# Patient Record
Sex: FEMALE | Race: WHITE | NOT HISPANIC OR LATINO | Employment: OTHER | URBAN - METROPOLITAN AREA
[De-identification: names, ages, dates, MRNs, and addresses within clinical notes are randomized per-mention and may not be internally consistent; named-entity substitution may affect disease eponyms.]

---

## 2017-01-09 ENCOUNTER — OFFICE VISIT (OUTPATIENT)
Dept: CARDIOLOGY | Facility: CLINIC | Age: 77
End: 2017-01-09
Attending: PHYSICIAN ASSISTANT
Payer: COMMERCIAL

## 2017-01-09 VITALS
BODY MASS INDEX: 27.23 KG/M2 | WEIGHT: 148 LBS | DIASTOLIC BLOOD PRESSURE: 74 MMHG | HEART RATE: 80 BPM | HEIGHT: 62 IN | SYSTOLIC BLOOD PRESSURE: 144 MMHG

## 2017-01-09 DIAGNOSIS — I47.10 SUPRAVENTRICULAR TACHYCARDIA (H): ICD-10-CM

## 2017-01-09 DIAGNOSIS — R03.0 ELEVATED BLOOD-PRESSURE READING WITHOUT DIAGNOSIS OF HYPERTENSION: Primary | ICD-10-CM

## 2017-01-09 PROCEDURE — 99213 OFFICE O/P EST LOW 20 MIN: CPT | Performed by: INTERNAL MEDICINE

## 2017-01-09 RX ORDER — FLUOROURACIL 50 MG/G
CREAM TOPICAL
COMMUNITY
Start: 2016-12-19

## 2017-01-09 NOTE — Clinical Note
1/9/2017    [facility]  [address] [address]    RE: Chasity Daviskevon       Dear Colleague,    I had the pleasure of seeing Chasity Dickerson in Cardiology Clinic today.  She is a 76-year-old female with history of occasional short runs of supraventricular tachycardia who returns for a followup.  She was seen by me last year and had a Zio Patch monitor which showed short SVT runs not longer than 11 seconds.  We offered her a beta blocker but she was already feeling better and she opted not to pursue it.  She has had high blood pressure readings in our office, but at home with her own blood pressure instrument shows normal blood pressure readings.  She denies any chest pain or shortness of breath.        PHYSICAL EXAMINATION:  Her blood pressure today again is 144/74, pulse 80 per minute and regular.  Cardiopulmonary examination unremarkable.        She is on lisinopril.  Her last lab work was at [facility] in 09/2016 and showed normal creatinine and potassium.  Cholesterol in 03/2016 showed LDL of 70, HDL of 90.  She works out practically every day.     Outpatient Encounter Prescriptions as of 1/9/2017   Medication Sig Dispense Refill     fluorouracil (EFUDEX) 5 % cream        allopurinol (ZYLOPRIM) 100 MG tablet Take 200 mg by mouth daily        aspirin 81 MG tablet Take 81 mg by mouth daily       lisinopril (PRINIVIL,ZESTRIL) 10 MG tablet Take 10 mg by mouth daily       chlorthalidone (HYGROTON) 25 MG tablet Take 25 mg by mouth daily       atorvastatin (LIPITOR) 40 MG tablet Take 40 mg by mouth daily       conjugated estrogens (PREMARIN) vaginal cream Place vaginally three times a week       calcium-vitamin D (CALTRATE) 600-400 MG-UNIT per tablet Take 1 tablet by mouth daily       multivitamin, therapeutic (THERA-VIT) TABS Take 1 tablet by mouth At Bedtime       Ascorbic Acid (VITAMIN C PO) Take by mouth every evening Dose unknown       No facility-administered encounter  medications on file as of 1/9/2017.      IMPRESSION:   1.  Supraventricular tachycardia.  The patient has had no recurrent palpitations.  She has not needed to go back on metoprolol.  She is continuing to have 2 cups of caffeine every day without any symptoms.  Given that she is doing well, I would suggest no further evaluation for the arrhythmia.  If it gets worse, I have asked her to call us.  Since she is doing well, I have asked her to continue to follow with [facility] and to see me on a p.r.n. basis.   2.  Hypertension.  Her blood pressure is usually high in our office but at home she tells me her blood pressures have been normal.  To clarify this issue, I suggested a 24-hour ambulatory blood pressure instrument which she will wear.  Once we have the readings, we will decide further plan.  If it is elevated with a mean or average blood pressure over 24 hours greater than 135/85, further optimization of blood pressure will be useful.  In that case, we can consider increasing her antihypertensives.  Presently, she is on 10 of lisinopril and 25 mg of chlorthalidone.  Her BMP will have to be checked again before adjusting any of these medications.  Alternatively, she can be referred back to her primary care physician for management.      Thank you for allowing us to participate in the care of this nice patient.  We will call her with the results of her 24-hour ambulatory blood pressure instrument and make further recommendations.  Otherwise, she will see me on a p.r.n. basis.  She will return to see her primary care physician.     Sincerely,    Joseph Decker MD     [facility]

## 2017-01-09 NOTE — PROGRESS NOTES
HISTORY OF PRESENT ILLNESS:  I had the pleasure of seeing Chasity Dickerson in Cardiology Clinic today.  She is a 76-year-old female with history of occasional short runs of supraventricular tachycardia who returns for a followup.  She was seen by me last year and had a Zio Patch monitor which showed short SVT runs not longer than 11 seconds.  We offered her a beta blocker but she was already feeling better and she opted not to pursue it.  She has had high blood pressure readings in our office, but at home with her own blood pressure instrument shows normal blood pressure readings.  She denies any chest pain or shortness of breath.        PHYSICAL EXAMINATION:  Her blood pressure today again is 144/74, pulse 80 per minute and regular.  Cardiopulmonary examination unremarkable.        She is on lisinopril.  Her last lab work was at [facility] in 09/2016 and showed normal creatinine and potassium.  Cholesterol in 03/2016 showed LDL of 70, HDL of 90.  She works out practically every day.      IMPRESSION:   1.  Supraventricular tachycardia.  The patient has had no recurrent palpitations.  She has not needed to go back on metoprolol.  She is continuing to have 2 cups of caffeine every day without any symptoms.  Given that she is doing well, I would suggest no further evaluation for the arrhythmia.  If it gets worse, I have asked her to call us.  Since she is doing well, I have asked her to continue to follow with [facility] and to see me on a p.r.n. basis.   2.  Hypertension.  Her blood pressure is usually high in our office but at home she tells me her blood pressures have been normal.  To clarify this issue, I suggested a 24-hour ambulatory blood pressure instrument which she will wear.  Once we have the readings, we will decide further plan.  If it is elevated with a mean or average blood pressure over 24 hours greater than 135/85, further optimization of blood pressure will be useful.  In that case, we  can consider increasing her antihypertensives.  Presently, she is on 10 of lisinopril and 25 mg of chlorthalidone.  Her BMP will have to be checked again before adjusting any of these medications.  Alternatively, she can be referred back to her primary care physician for management.      Thank you for allowing us to participate in the care of this nice patient.  We will call her with the results of her 24-hour ambulatory blood pressure instrument and make further recommendations.  Otherwise, she will see me on a p.r.n. basis.  She will return to see her primary care physician.      cc:   [facility]         MAGDA HUNTER MD             D: 2017 09:37   T: 2017 11:48   MT: CRISTOBAL      Name:     AGAPITO MANE   MRN:      [id number]        Account:      [id number]   :      1940           Service Date: 2017      Document: [id number]

## 2017-01-09 NOTE — PROGRESS NOTES
HPI and Plan:   See dictation  [id number]    Orders Placed This Encounter   Procedures     24 Hour Blood Pressure Monitor - Adult       Orders Placed This Encounter   Medications     fluorouracil (EFUDEX) 5 % cream     Sig:        There are no discontinued medications.      Encounter Diagnoses   Name Primary?     Supraventricular tachycardia (H)      Elevated blood-pressure reading without diagnosis of hypertension Yes       CURRENT MEDICATIONS:  Current Outpatient Prescriptions   Medication Sig Dispense Refill     fluorouracil (EFUDEX) 5 % cream        allopurinol (ZYLOPRIM) 100 MG tablet Take 200 mg by mouth daily        aspirin 81 MG tablet Take 81 mg by mouth daily       lisinopril (PRINIVIL,ZESTRIL) 10 MG tablet Take 10 mg by mouth daily       chlorthalidone (HYGROTON) 25 MG tablet Take 25 mg by mouth daily       atorvastatin (LIPITOR) 40 MG tablet Take 40 mg by mouth daily       conjugated estrogens (PREMARIN) vaginal cream Place vaginally three times a week       calcium-vitamin D (CALTRATE) 600-400 MG-UNIT per tablet Take 1 tablet by mouth daily       multivitamin, therapeutic (THERA-VIT) TABS Take 1 tablet by mouth At Bedtime       Ascorbic Acid (VITAMIN C PO) Take by mouth every evening Dose unknown         ALLERGIES     Allergies   Allergen Reactions     Atenolol      lightheadedness       PAST MEDICAL HISTORY:  Past Medical History   Diagnosis Date     Chronic kidney disease      Hypertension      Palpitations      Cataracts, bilateral      Gout      Chronic diarrhea      Glaucoma      Acute onset aura migraine      First degree AV block        PAST SURGICAL HISTORY:  Past Surgical History   Procedure Laterality Date     Orthopedic surgery       Ent surgery       tonsillectomy     Lig fallopion         FAMILY HISTORY:  Family History   Problem Relation Age of Onset     Other - See Comments Mother      hemorrhage during birth     Emphysema Father      CANCER Brother        SOCIAL HISTORY:  Social History  "    Social History     Marital Status:      Spouse Name: N/A     Number of Children: N/A     Years of Education: N/A     Social History Main Topics     Smoking status: Never Smoker      Smokeless tobacco: None     Alcohol Use: 0.0 oz/week     0 Standard drinks or equivalent per week      Comment: one glass wine daily     Drug Use: No     Sexual Activity: Not Asked     Other Topics Concern     Caffeine Concern Yes     2 cups coffee per day     Sleep Concern No     Stress Concern No     Weight Concern No     Special Diet Yes     low sodium diet     Back Care No     Exercise Yes     walks daily, weights, yoga     Parent/Sibling W/ Cabg, Mi Or Angioplasty Before 65f 55m? No     Social History Narrative       Review of Systems:  Skin:  Negative       Eyes:  Positive for glasses    ENT:  Negative      Respiratory:  Negative       Cardiovascular:  Negative      Gastroenterology: Negative      Genitourinary:  Negative      Musculoskeletal:  Negative      Neurologic:  Negative      Psychiatric:  Negative      Heme/Lymph/Imm:  Negative      Endocrine:  Negative        Physical Exam:  Vitals: /74 mmHg  Pulse 80  Ht 1.575 m (5' 2\")  Wt 67.132 kg (148 lb)  BMI 27.06 kg/m2    Constitutional:  cooperative;alert and oriented        Skin:  warm and dry to the touch        Head:  normocephalic        Eyes:  pupils equal and round        ENT:  not assessed this visit        Neck:  JVP normal;carotid pulses are full and equal bilaterally        Chest:  clear to auscultation          Cardiac: regular rhythm;normal S1 and S2     no presence of murmur            Abdomen:  abdomen soft;BS normoactive        Vascular:                                          Extremities and Back:  no edema              Neurological:  no gross motor deficits              LENNIE Story PA-C  [facility] [place] [address] [place]  [place] [address]                "

## 2017-01-16 ENCOUNTER — HOSPITAL ENCOUNTER (OUTPATIENT)
Dept: CARDIOLOGY | Facility: CLINIC | Age: 77
Discharge: HOME | End: 2017-01-16
Attending: INTERNAL MEDICINE | Admitting: INTERNAL MEDICINE
Payer: MEDICARE

## 2017-01-16 DIAGNOSIS — R03.0 ELEVATED BLOOD-PRESSURE READING WITHOUT DIAGNOSIS OF HYPERTENSION: ICD-10-CM

## 2017-01-16 PROCEDURE — 93788 AMBL BP MNTR W/SW A/R: CPT

## 2017-01-16 PROCEDURE — 93790 AMBL BP MNTR W/SW I&R: CPT | Performed by: INTERNAL MEDICINE

## 2017-01-16 PROCEDURE — 93786 AMBL BP MNTR W/SW REC ONLY: CPT

## 2017-01-19 ENCOUNTER — TELEPHONE (OUTPATIENT)
Dept: CARDIOLOGY | Facility: CLINIC | Age: 77
End: 2017-01-19

## 2017-01-23 NOTE — TELEPHONE ENCOUNTER
Notes Recorded by Joseph Decker MD on 1/19/2017 at 11:14 AM  BP high day time. Will benefit from higher dose of lisinopril but needs BMP first. Should see PMD, get higher dose lisinopril after confirming labs are stable. Left message for Pt to call to discuss DR Decker's recommendations. Pt informed of this information.  SHAREE Crawley RN

## 2019-03-25 ENCOUNTER — HOSPITAL ENCOUNTER (EMERGENCY)
Facility: CLINIC | Age: 79
Discharge: HOME | End: 2019-03-25
Attending: EMERGENCY MEDICINE | Admitting: EMERGENCY MEDICINE
Payer: COMMERCIAL

## 2019-03-25 VITALS
HEART RATE: 67 BPM | TEMPERATURE: 98.1 F | OXYGEN SATURATION: 96 % | DIASTOLIC BLOOD PRESSURE: 83 MMHG | WEIGHT: 145 LBS | SYSTOLIC BLOOD PRESSURE: 184 MMHG | BODY MASS INDEX: 27.38 KG/M2 | RESPIRATION RATE: 16 BRPM | HEIGHT: 61 IN

## 2019-03-25 DIAGNOSIS — E87.6 HYPOKALEMIA: ICD-10-CM

## 2019-03-25 DIAGNOSIS — I10 ESSENTIAL HYPERTENSION: ICD-10-CM

## 2019-03-25 LAB
ANION GAP SERPL CALCULATED.3IONS-SCNC: 9 MMOL/L (ref 3–14)
BUN SERPL-MCNC: 30 MG/DL (ref 7–30)
CALCIUM SERPL-MCNC: 9.5 MG/DL (ref 8.5–10.1)
CHLORIDE SERPL-SCNC: 104 MMOL/L (ref 94–109)
CO2 SERPL-SCNC: 25 MMOL/L (ref 20–32)
CREAT SERPL-MCNC: 1.07 MG/DL (ref 0.52–1.04)
GFR SERPL CREATININE-BSD FRML MDRD: 50 ML/MIN/{1.73_M2}
GLUCOSE SERPL-MCNC: 106 MG/DL (ref 70–99)
POTASSIUM SERPL-SCNC: 3.2 MMOL/L (ref 3.4–5.3)
SODIUM SERPL-SCNC: 138 MMOL/L (ref 133–144)

## 2019-03-25 PROCEDURE — 99283 EMERGENCY DEPT VISIT LOW MDM: CPT

## 2019-03-25 PROCEDURE — 80048 BASIC METABOLIC PNL TOTAL CA: CPT | Performed by: EMERGENCY MEDICINE

## 2019-03-25 PROCEDURE — 25000132 ZZH RX MED GY IP 250 OP 250 PS 637: Performed by: EMERGENCY MEDICINE

## 2019-03-25 PROCEDURE — A9270 NON-COVERED ITEM OR SERVICE: HCPCS | Mod: GY | Performed by: EMERGENCY MEDICINE

## 2019-03-25 RX ORDER — POTASSIUM CHLORIDE 1500 MG/1
40 TABLET, EXTENDED RELEASE ORAL ONCE
Status: COMPLETED | OUTPATIENT
Start: 2019-03-25 | End: 2019-03-25

## 2019-03-25 RX ADMIN — POTASSIUM CHLORIDE 40 MEQ: 1500 TABLET, EXTENDED RELEASE ORAL at 21:35

## 2019-03-25 ASSESSMENT — ENCOUNTER SYMPTOMS
DIFFICULTY URINATING: 0
FATIGUE: 1
BLOOD IN STOOL: 0
SHORTNESS OF BREATH: 0
DIARRHEA: 1
HEADACHES: 1
NERVOUS/ANXIOUS: 1

## 2019-03-25 ASSESSMENT — MIFFLIN-ST. JEOR: SCORE: 1085.1

## 2019-03-25 NOTE — ED AVS SNAPSHOT
Emergency Department  [address] [place] [id number]  Phone:  [phone]  Fax:  [phone]                                    Chasity Dickerson   MRN: [id number]    Department:   Emergency Department   Date of Visit:  3/25/2019           After Visit Summary Signature Page    I have received my discharge instructions, and my questions have been answered. I have discussed any challenges I see with this plan with the nurse or doctor.    ..........................................................................................................................................  Patient/Patient Representative Signature      ..........................................................................................................................................  Patient Representative Print Name and Relationship to Patient    ..................................................               ................................................  Date                                   Time    ..........................................................................................................................................  Reviewed by Signature/Title    ...................................................              ..............................................  Date                                               Time          22EPIC Rev 08/18

## 2019-03-26 NOTE — ED PROVIDER NOTES
History     Chief Complaint:  Headache     HPI   Chasity Dickerson is a 78 year old female with a history of hypertension, chronic kidney disease, and supraventricular tachycardia who presents to the emergency department with her  for evaluation of headache and hypertension. The patient reports that yesterday was a really busy day, and in the afternoon she developed fatigue, a headache and felt agitated. She then took her blood pressure and it was 195/95, which is the highest it's ever been. Today, she checked it multiple times and it was around 140-150s/70s-80s. Then tonight prior to arrival, it was 195/101 and she continues to have a 1/10 dull headache which prompted her evaluation to the ED. She has been taking her Lisinopril and Hygroton as prescribed with no missed doses. She also notes she's had 3-4 episodes of diarrhea for the past several days of which she has seen a gastroenterologist for, and they recommended that she take three tablets of Pepto Bismol three times a day, which she has taken for the past week. The patient denies urinary issues, chest pain, shortness of breath, or blood in her stools.     Allergies:  Atenolol      Medications:    Zyloprim  Aspirin 81 mg  Lipitor  Caltrate  Hygroton  Premarin  Efudex  Lisinopril     Past Medical History:    Migraines  Bilateral cataracts  Chronic diarrhea  Chronic kidney disease  First degree AV block  Glaucoma  Gout  Hypertension  Palpitations  Supraventricular tachycardia    Past Surgical History:    Tonsillectomy  Fallopian ligation  Orthopedic surgery     Family History:    Emphysema  Cancer  Birth hemorrhage     Social History:  Negative for tobacco use.  Positive for daily wine use.   Marital Status:        Review of Systems   Constitutional: Positive for fatigue.   Respiratory: Negative for shortness of breath.    Cardiovascular: Negative for chest pain.   Gastrointestinal: Positive for diarrhea. Negative for blood in stool.  "  Genitourinary: Negative for decreased urine volume and difficulty urinating.   Neurological: Positive for headaches.   Psychiatric/Behavioral: The patient is nervous/anxious.    All other systems reviewed and are negative.    Physical Exam     Patient Vitals for the past 24 hrs:   BP Temp Temp src Pulse Heart Rate Resp SpO2 Height Weight   03/25/19 2100 184/83 -- -- 67 -- -- 96 % -- --   03/25/19 2050 178/80 -- -- -- -- -- 96 % -- --   03/25/19 2045 178/80 -- -- -- -- -- 96 % -- --   03/25/19 2005 (!) 206/97 -- -- -- -- -- 98 % -- --   03/25/19 2000 (!) 194/103 -- -- -- -- -- 98 % -- --   03/25/19 1947 (!) 215/105 98.1  F (36.7  C) Oral -- 85 16 98 % 1.549 m (5' 1\") 65.8 kg (145 lb)     Physical Exam  General: Alert and cooperative with exam. Patient in mild distress. Normal mentation.  Head:  Scalp is NC/AT  Eyes:  No scleral icterus, PERRL, EOMI   ENT:  The external nose and ears are normal. The oropharynx is normal and without erythema; mucus membranes are moist. Uvula midline, no evidence of deep space infection.  Neck:  Normal range of motion without rigidity.  CV:  Regular rate and rhythm    No pathologic murmur   Resp:  Breath sounds are clear bilaterally    Non-labored, no retractions or accessory muscle use  GI:  Abdomen is soft, no distension, no tenderness. No peritoneal signs  MS:  No lower extremity edema   Skin:  Warm and dry, No rash or lesions noted.  Neuro: Oriented x 3. No gross motor deficits.    Strength and sensation grossly intact in all 4 extremities.      Cranial nerves 2-12 intact.    GCS: 15     Emergency Department Course   Laboratory:  BMP: Glucose 106 (H), Creatinine: 1.07 (H), GFR: 50 (L), Potassium: 3.2 (L), o/w WNL     Interventions:  2135 Potassium chloride 40 mEq tablet PO    Emergency Department Course:  1951 Nursing notes and vitals reviewed. I performed an exam of the patient as documented above.     Medicine administered as documented above. Blood drawn. This was sent to the " lab for further testing, results above.    2108 I rechecked the patient and discussed the results of her workup thus far.     2118 I rechecked the patient and discussed the results of her workup thus far.     Findings and plan explained to the Patient. Patient discharged home with instructions regarding supportive care, medications, and reasons to return. The importance of close follow-up was reviewed.     I personally reviewed the laboratory results with the Patient and answered all related questions prior to discharge.     Impression & Plan    Medical Decision Making:  Chasity Dickerson is a 78 year old female who presents for evaluation of elevated blood pressure.  There is a history of hypertension in the past.  The workup here is negative and the patient does not have any clinical, laboratory, or historical signs of end-organ dysfunction.  There is no signs of hypertensive emergency or urgency.  Supportive outpatient management is therefore indicated with close follow-up of primary care physician.  Will encourage serial blood pressure monitoring at home to aid primary in decision making regarding hypertension.  Additionally noted to be mildly hypokalemic and provided oral replacement; discussed continued dietary replacement of potassium.    Diagnosis:    ICD-10-CM    1. Essential hypertension I10    2. Hypokalemia E87.6        Disposition:  Discharged to home    Scribe Disclosure:  I, Dakotah Adams, am serving as a scribe on 3/25/2019 at 7:51 PM to personally document services performed by Dr. Emily DO based on my observations and the provider's statements to me.     Dakotah Adams  3/25/2019    EMERGENCY DEPARTMENT       Emeka Diaz DO  03/25/19 9490

## 2020-03-10 ENCOUNTER — HEALTH MAINTENANCE LETTER (OUTPATIENT)
Age: 80
End: 2020-03-10

## 2020-12-27 ENCOUNTER — HEALTH MAINTENANCE LETTER (OUTPATIENT)
Age: 80
End: 2020-12-27

## 2021-04-24 ENCOUNTER — HEALTH MAINTENANCE LETTER (OUTPATIENT)
Age: 81
End: 2021-04-24

## 2021-10-03 ENCOUNTER — HEALTH MAINTENANCE LETTER (OUTPATIENT)
Age: 81
End: 2021-10-03

## 2022-05-15 ENCOUNTER — HEALTH MAINTENANCE LETTER (OUTPATIENT)
Age: 82
End: 2022-05-15

## 2022-09-11 ENCOUNTER — HEALTH MAINTENANCE LETTER (OUTPATIENT)
Age: 82
End: 2022-09-11

## 2022-11-06 ENCOUNTER — APPOINTMENT (OUTPATIENT)
Dept: MRI IMAGING | Facility: CLINIC | Age: 82
End: 2022-11-06
Attending: EMERGENCY MEDICINE
Payer: COMMERCIAL

## 2022-11-06 ENCOUNTER — HOSPITAL ENCOUNTER (EMERGENCY)
Facility: CLINIC | Age: 82
Discharge: HOME | End: 2022-11-06
Attending: EMERGENCY MEDICINE | Admitting: EMERGENCY MEDICINE
Payer: COMMERCIAL

## 2022-11-06 VITALS
WEIGHT: 138.89 LBS | SYSTOLIC BLOOD PRESSURE: 208 MMHG | HEART RATE: 74 BPM | TEMPERATURE: 97.1 F | OXYGEN SATURATION: 97 % | BODY MASS INDEX: 26.24 KG/M2 | DIASTOLIC BLOOD PRESSURE: 103 MMHG | RESPIRATION RATE: 18 BRPM

## 2022-11-06 DIAGNOSIS — R03.0 ELEVATED BLOOD PRESSURE READING: ICD-10-CM

## 2022-11-06 DIAGNOSIS — R51.9 NONINTRACTABLE HEADACHE, UNSPECIFIED CHRONICITY PATTERN, UNSPECIFIED HEADACHE TYPE: ICD-10-CM

## 2022-11-06 DIAGNOSIS — R42 VERTIGO: ICD-10-CM

## 2022-11-06 LAB
ANION GAP SERPL CALCULATED.3IONS-SCNC: 14 MMOL/L (ref 7–15)
BASOPHILS # BLD AUTO: 0 10E3/UL (ref 0–0.2)
BASOPHILS NFR BLD AUTO: 0 %
BUN SERPL-MCNC: 24.3 MG/DL (ref 8–23)
CALCIUM SERPL-MCNC: 8.9 MG/DL (ref 8.8–10.2)
CHLORIDE SERPL-SCNC: 99 MMOL/L (ref 98–107)
CREAT SERPL-MCNC: 0.89 MG/DL (ref 0.51–0.95)
DEPRECATED HCO3 PLAS-SCNC: 23 MMOL/L (ref 22–29)
EOSINOPHIL # BLD AUTO: 0.1 10E3/UL (ref 0–0.7)
EOSINOPHIL NFR BLD AUTO: 1 %
ERYTHROCYTE [DISTWIDTH] IN BLOOD BY AUTOMATED COUNT: 12.3 % (ref 10–15)
GFR SERPL CREATININE-BSD FRML MDRD: 65 ML/MIN/1.73M2
GLUCOSE SERPL-MCNC: 121 MG/DL (ref 70–99)
HCT VFR BLD AUTO: 38.6 % (ref 35–47)
HGB BLD-MCNC: 12.4 G/DL (ref 11.7–15.7)
HOLD SPECIMEN: NORMAL
IMM GRANULOCYTES # BLD: 0 10E3/UL
IMM GRANULOCYTES NFR BLD: 0 %
LYMPHOCYTES # BLD AUTO: 1.7 10E3/UL (ref 0.8–5.3)
LYMPHOCYTES NFR BLD AUTO: 22 %
MCH RBC QN AUTO: 33.1 PG (ref 26.5–33)
MCHC RBC AUTO-ENTMCNC: 32.1 G/DL (ref 31.5–36.5)
MCV RBC AUTO: 103 FL (ref 78–100)
MONOCYTES # BLD AUTO: 0.4 10E3/UL (ref 0–1.3)
MONOCYTES NFR BLD AUTO: 5 %
NEUTROPHILS # BLD AUTO: 5.8 10E3/UL (ref 1.6–8.3)
NEUTROPHILS NFR BLD AUTO: 72 %
NRBC # BLD AUTO: 0 10E3/UL
NRBC BLD AUTO-RTO: 0 /100
PLATELET # BLD AUTO: 259 10E3/UL (ref 150–450)
POTASSIUM SERPL-SCNC: 3.8 MMOL/L (ref 3.4–5.3)
RBC # BLD AUTO: 3.75 10E6/UL (ref 3.8–5.2)
SODIUM SERPL-SCNC: 136 MMOL/L (ref 136–145)
TROPONIN T SERPL HS-MCNC: 9 NG/L
WBC # BLD AUTO: 8.1 10E3/UL (ref 4–11)

## 2022-11-06 PROCEDURE — A9585 GADOBUTROL INJECTION: HCPCS | Performed by: EMERGENCY MEDICINE

## 2022-11-06 PROCEDURE — 80048 BASIC METABOLIC PNL TOTAL CA: CPT | Performed by: EMERGENCY MEDICINE

## 2022-11-06 PROCEDURE — 250N000013 HC RX MED GY IP 250 OP 250 PS 637: Performed by: EMERGENCY MEDICINE

## 2022-11-06 PROCEDURE — 70553 MRI BRAIN STEM W/O & W/DYE: CPT

## 2022-11-06 PROCEDURE — 93005 ELECTROCARDIOGRAM TRACING: CPT

## 2022-11-06 PROCEDURE — 85025 COMPLETE CBC W/AUTO DIFF WBC: CPT | Performed by: EMERGENCY MEDICINE

## 2022-11-06 PROCEDURE — 255N000002 HC RX 255 OP 636: Performed by: EMERGENCY MEDICINE

## 2022-11-06 PROCEDURE — 70544 MR ANGIOGRAPHY HEAD W/O DYE: CPT

## 2022-11-06 PROCEDURE — 36415 COLL VENOUS BLD VENIPUNCTURE: CPT | Performed by: EMERGENCY MEDICINE

## 2022-11-06 PROCEDURE — 99285 EMERGENCY DEPT VISIT HI MDM: CPT | Mod: 25

## 2022-11-06 PROCEDURE — 70549 MR ANGIOGRAPH NECK W/O&W/DYE: CPT

## 2022-11-06 PROCEDURE — 84484 ASSAY OF TROPONIN QUANT: CPT | Performed by: EMERGENCY MEDICINE

## 2022-11-06 RX ORDER — GADOBUTROL 604.72 MG/ML
10 INJECTION INTRAVENOUS ONCE
Status: COMPLETED | OUTPATIENT
Start: 2022-11-06 | End: 2022-11-06

## 2022-11-06 RX ORDER — MECLIZINE HYDROCHLORIDE 25 MG/1
25 TABLET ORAL 3 TIMES DAILY PRN
Qty: 10 TABLET | Refills: 0 | Status: SHIPPED | OUTPATIENT
Start: 2022-11-06

## 2022-11-06 RX ORDER — MECLIZINE HYDROCHLORIDE 25 MG/1
25 TABLET ORAL ONCE
Status: COMPLETED | OUTPATIENT
Start: 2022-11-06 | End: 2022-11-06

## 2022-11-06 RX ORDER — ACETAMINOPHEN 500 MG
1000 TABLET ORAL ONCE
Status: COMPLETED | OUTPATIENT
Start: 2022-11-06 | End: 2022-11-06

## 2022-11-06 RX ADMIN — MECLIZINE HYDROCHLORIDE 25 MG: 25 TABLET ORAL at 19:19

## 2022-11-06 RX ADMIN — GADOBUTROL 10 ML: 604.72 INJECTION INTRAVENOUS at 18:23

## 2022-11-06 RX ADMIN — ACETAMINOPHEN 1000 MG: 500 TABLET ORAL at 19:40

## 2022-11-06 ASSESSMENT — ACTIVITIES OF DAILY LIVING (ADL)
ADLS_ACUITY_SCORE: 35
ADLS_ACUITY_SCORE: 33
ADLS_ACUITY_SCORE: 35
ADLS_ACUITY_SCORE: 35

## 2022-11-06 NOTE — ED TRIAGE NOTES
Patient presents with complaints of dizziness, nausea which started at 0830 this morning. Patient does have history of vertigo and states this feels similar. Denies any headaches, vision changes, numbness, tingling or weakness. ABC intact without need for intervention at this time. Of note, patient does have history of HTN and BP has been running high, her PCP recently increased her BP med's but she has not started her new dose yet. Denies any chest pain or SOB.

## 2022-11-06 NOTE — ED NOTES
Rapid Assessment Note    History:   Chasity Dickerson is a 82 year old female who presents onset of right occipital headache and vertigo, beginning today. Symptoms are worse with upright position and ambulation, +nausea without vomiting.    Exam:   General:  Alert, interactive  Cardiovascular:  Well perfused  Lungs:  No respiratory distress, no accessory muscle use  Neuro:  Moving all 4 extremities  Skin:  Warm, dry  Psych:  Normal affect      Plan of Care:   I evaluated the patient and developed an initial plan of care. I discussed this plan and explained that I, or one of my partners, would be returning to complete the evaluation.          Richar Whelan MD  11/06/22 0988

## 2022-11-07 LAB
ATRIAL RATE - MUSE: 62 BPM
DIASTOLIC BLOOD PRESSURE - MUSE: NORMAL MMHG
INTERPRETATION ECG - MUSE: NORMAL
P AXIS - MUSE: 43 DEGREES
PR INTERVAL - MUSE: 208 MS
QRS DURATION - MUSE: 92 MS
QT - MUSE: 446 MS
QTC - MUSE: 452 MS
R AXIS - MUSE: 0 DEGREES
SYSTOLIC BLOOD PRESSURE - MUSE: NORMAL MMHG
T AXIS - MUSE: 31 DEGREES
VENTRICULAR RATE- MUSE: 62 BPM

## 2022-11-07 ASSESSMENT — ACTIVITIES OF DAILY LIVING (ADL): ADLS_ACUITY_SCORE: 35

## 2022-11-07 NOTE — ED PROVIDER NOTES
History     Chief Complaint:    Vertigo      HPI   Chasity Dickerson is a 82 year old female who presents with new onset vertiginous dizziness and right occipital headache today.  She notes onset of symptoms this morning, which is a new symptom for her.  Symptoms are predominantly provoked with standing up or looking upright.  Leftward and rightward gaze not appear to reproduce symptoms.  She does note a right occipital headache which is mild but new for her as well.  She denies fevers, chills, head injury, vision changes, numbness, weakness, paresthesias, or any other concerns.        Review of Systems  Neuro: + as per above  All other systems reviewed and negative      Allergies:    Atenolol      Medications:    allopurinol (ZYLOPRIM) 100 MG tablet  Ascorbic Acid (VITAMIN C PO)  aspirin 81 MG tablet  atorvastatin (LIPITOR) 40 MG tablet  calcium-vitamin D (CALTRATE) 600-400 MG-UNIT per tablet  chlorthalidone (HYGROTON) 25 MG tablet  conjugated estrogens (PREMARIN) vaginal cream  fluorouracil (EFUDEX) 5 % cream  lisinopril (PRINIVIL,ZESTRIL) 10 MG tablet  multivitamin, therapeutic (THERA-VIT) TABS        Past Medical History:    Past Medical History:   Diagnosis Date     Acute onset aura migraine      Cataracts, bilateral      Chronic diarrhea      Chronic kidney disease      First degree AV block      Glaucoma      Gout      Hypertension      Palpitations      Patient Active Problem List    Diagnosis Date Noted     Supraventricular tachycardia (H) 12/09/2015     Priority: Medium     Hypertension      Priority: Medium     Chronic kidney disease      Priority: Medium     Palpitations      Priority: Medium     First degree AV block      Priority: Medium        Past Surgical History:    Past Surgical History:   Procedure Laterality Date     ENT SURGERY      tonsillectomy     lig fallopion       ORTHOPEDIC SURGERY         Family History:    Family History   Problem Relation Age of Onset     Other - See Comments Mother          hemorrhage during birth     Emphysema Father      Cancer Brother        Social History:  Presents with spouse    Physical Exam     Patient Vitals for the past 24 hrs:   BP Temp Temp src Pulse Resp SpO2 Weight   11/06/22 2006 -- -- -- -- 18 -- --   11/06/22 2005 -- -- -- -- -- 97 % --   11/06/22 2004 -- -- -- -- -- 97 % --   11/06/22 2003 -- -- -- -- -- 98 % --   11/06/22 2002 -- -- -- -- -- 99 % --   11/06/22 2001 -- -- -- -- -- 98 % --   11/06/22 2000 (!) 208/103 -- -- 74 -- 98 % --   11/06/22 1608 (!) 213/91 97.1  F (36.2  C) Temporal 77 16 99 % 63 kg (138 lb 14.2 oz)       Physical Exam  Constitutional: Alert, attentive  HENT:    Nose: Nose normal.    Mouth/Throat: Oropharynx is clear, mucous membranes are moist  Eyes: EOM are normal. Pupils are equal, round, and reactive to light.   CV: Regular rate and rhythm, no murmurs, rubs or gallops.  Chest: Effort normal and breath sounds normal.   GI: No distension. There is no tenderness  MSK: Normal range of motion.   Neurological:   CN 2-12 intact  Normal sensation and strength  normal gait   No meningismus   Skin: Skin is warm and dry.        Emergency Department Course       ECG results from 11/06/22   EKG 12 lead     Value    Systolic Blood Pressure     Diastolic Blood Pressure     Ventricular Rate 62    Atrial Rate 62    MT Interval 208    QRS Duration 92        QTc 452    P Axis 43    R AXIS 0    T Axis 31    Interpretation ECG      Sinus rhythm  Normal ECG  When compared with ECG of 02-DEC-2015 10:01,  Minimal criteria for Anterior infarct are no longer Present         Imaging:  MR Brain w/o & w Contrast   Final Result   IMPRESSION:   HEAD MRI:    1.  No acute intracranial process.   2.  Generalized brain atrophy and presumed microvascular ischemic changes as detailed above.      HEAD MRA:    1.  Normal MRA [place].      NECK MRA:   1.  Normal neck MRA.      MRA Neck (Carotids) wo & w Contrast   Final Result   IMPRESSION:   HEAD MRI:    1.   No acute intracranial process.   2.  Generalized brain atrophy and presumed microvascular ischemic changes as detailed above.      HEAD MRA:    1.  Normal MRA [place].      NECK MRA:   1.  Normal neck MRA.      MRA Brain ([facility] [place]) wo Contrast   Final Result   IMPRESSION:   HEAD MRI:    1.  No acute intracranial process.   2.  Generalized brain atrophy and presumed microvascular ischemic changes as detailed above.      HEAD MRA:    1.  Normal MRA [place].      NECK MRA:   1.  Normal neck MRA.        Laboratory:  Labs Ordered and Resulted from Time of ED Arrival to Time of ED Departure   BASIC METABOLIC PANEL - Abnormal       Result Value    Sodium 136      Potassium 3.8      Chloride 99      Carbon Dioxide (CO2) 23      Anion Gap 14      Urea Nitrogen 24.3 (*)     Creatinine 0.89      Calcium 8.9      Glucose 121 (*)     GFR Estimate 65     CBC WITH PLATELETS AND DIFFERENTIAL - Abnormal    WBC Count 8.1      RBC Count 3.75 (*)     Hemoglobin 12.4      Hematocrit 38.6       (*)     MCH 33.1 (*)     MCHC 32.1      RDW 12.3      Platelet Count 259      % Neutrophils 72      % Lymphocytes 22      % Monocytes 5      % Eosinophils 1      % Basophils 0      % Immature Granulocytes 0      NRBCs per 100 WBC 0      Absolute Neutrophils 5.8      Absolute Lymphocytes 1.7      Absolute Monocytes 0.4      Absolute Eosinophils 0.1      Absolute Basophils 0.0      Absolute Immature Granulocytes 0.0      Absolute NRBCs 0.0     TROPONIN T, HIGH SENSITIVITY - Normal    Troponin T, High Sensitivity 9         Procedures:      Emergency Department Course:             Reviewed:    I reviewed nursing notes, vitals and past medical history      Interventions:  Medications   gadobutrol (GADAVIST) injection 10 mL (10 mLs Intravenous Given 11/6/22 1823)   sodium chloride (PF) 0.9% PF flush 60 mL (100 mLs Intravenous Given 11/6/22 1824)   meclizine (ANTIVERT) tablet 25 mg (25 mg Oral Given 11/6/22 1919)    acetaminophen (TYLENOL) tablet 1,000 mg (1,000 mg Oral Given 11/6/22 1940)       Disposition:  The patient was discharged to home.     Impression & Plan      Medical Decision Making:  This is a 82-year-old female who presents for evaluation of vertigo and right occipital headache, also with notable hypertension.  Symptoms or not consistent with BPPV, and given headache, MRI and MRA series were performed to rule out possible occipital stroke or other more concerning etiology.  Symptoms are improved with supportive cares.  Fortunately, MRI and MRA series are negative for acute pathology.  She does have elevated blood pressure in the department but has averaged systolic blood pressures of 140s and 150s at home.  She is pending a change to an increased dose of lisinopril from 30 mg daily to 40 mg daily.  We had a shared decision made conversation she would prefer to undergo no further changes at this time.  Renal function is normal.  No other signs or symptoms of hypertensive emergency.  Symptoms are essentially resolved on recheck.  Plan primary care follow-up in 3 to 5 days return precautions for severe headache, chest pain, stroke symptoms, or any other concerns.    Covid-19  Chasity Dickerson was evaluated during a global COVID-19 pandemic, which necessitated consideration that the patient might be at risk for infection with the SARS-CoV-2 virus that causes COVID-19.   Applicable protocols for evaluation were followed during the patient's care.   COVID-19 was considered as part of the patient's evaluation.    Diagnosis:    ICD-10-CM    1. Vertigo  R42       2. Nonintractable headache, unspecified chronicity pattern, unspecified headache type  R51.9       3. Elevated blood pressure reading  R03.0           Discharge Medications:  New Prescriptions    MECLIZINE (ANTIVERT) 25 MG TABLET    Take 1 tablet (25 mg) by mouth 3 times daily as needed for dizziness            Richar Whelan MD  11/06/22 1214

## 2023-01-22 ENCOUNTER — HEALTH MAINTENANCE LETTER (OUTPATIENT)
Age: 83
End: 2023-01-22

## 2024-02-18 ENCOUNTER — HEALTH MAINTENANCE LETTER (OUTPATIENT)
Age: 84
End: 2024-02-18

## 2025-03-09 ENCOUNTER — HEALTH MAINTENANCE LETTER (OUTPATIENT)
Age: 85
End: 2025-03-09